# Patient Record
(demographics unavailable — no encounter records)

---

## 2025-07-08 NOTE — PHYSICAL EXAM
[FreeTextEntry3] : hypopigmented scaly thin plaques on bilateral temples near eyes, cheeks, neck, and upper chest wood's negative  verrucous papule lower cutaneous lip

## 2025-07-08 NOTE — ASSESSMENT
[FreeTextEntry1] : #dermatitis, new, acute P. alba favored vs eczema vs PIH with previous irritant/contact vs hypopigmented MF - not vitiligo, not tinea versicolor or tinea corporis PLAN - fungal swab in case, but low suspicion - photos taken for monitoring - treatment trial with TCS, then would do biopsy - start hydrocortisone 2.5% ointment to the affected area BID 2 weeks, SED, instructions discussed, pt verbalized understanding  #VV, lower cutaneous lip 1 lesion(s) were treated with liquid nitrogen via CTA for several seconds, with a ~10 second thaw interval for a total of 2 cycles. The patient was informed of the potential of erythema, blister formation, hypo- or hyperpigmentation, and scar formation at the site. Procedure was well tolerated without complication. Wound care instructions discussed with patient  RTC 3-4 weeks for cryo and rash

## 2025-07-08 NOTE — HISTORY OF PRESENT ILLNESS
[FreeTextEntry1] : NPV - white spots and wart [de-identified] : 7 y/o M here with parents and brother for white spots on face and wart #white spots on face - scaly white spots on face, neck, and upper chest, mom said it was red at first, saw him scratch only once or twice - not itchy, was never itchy - 1 month and spreading; felt it started when pt was playing in grass - not tried anything for it, feels like it is spreading #wart on lower cutaneous lip, hasn't tried anything

## 2025-07-08 NOTE — HISTORY OF PRESENT ILLNESS
[FreeTextEntry1] : NPV - white spots and wart [de-identified] : 7 y/o M here with parents and brother for white spots on face and wart #white spots on face - scaly white spots on face, neck, and upper chest, mom said it was red at first, saw him scratch only once or twice - not itchy, was never itchy - 1 month and spreading; felt it started when pt was playing in grass - not tried anything for it, feels like it is spreading #wart on lower cutaneous lip, hasn't tried anything